# Patient Record
Sex: MALE | Race: WHITE
[De-identification: names, ages, dates, MRNs, and addresses within clinical notes are randomized per-mention and may not be internally consistent; named-entity substitution may affect disease eponyms.]

---

## 2019-01-03 ENCOUNTER — HOSPITAL ENCOUNTER (EMERGENCY)
Dept: HOSPITAL 61 - ER | Age: 24
Discharge: HOME | End: 2019-01-03
Payer: SELF-PAY

## 2019-01-03 VITALS — SYSTOLIC BLOOD PRESSURE: 152 MMHG | DIASTOLIC BLOOD PRESSURE: 71 MMHG

## 2019-01-03 VITALS — BODY MASS INDEX: 25.71 KG/M2 | WEIGHT: 160 LBS | HEIGHT: 66 IN

## 2019-01-03 DIAGNOSIS — S62.633A: Primary | ICD-10-CM
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
## 2019-01-03 PROCEDURE — 90715 TDAP VACCINE 7 YRS/> IM: CPT

## 2019-01-03 PROCEDURE — 90471 IMMUNIZATION ADMIN: CPT

## 2019-01-03 PROCEDURE — 29130 APPL FINGER SPLINT STATIC: CPT

## 2019-01-03 PROCEDURE — 73140 X-RAY EXAM OF FINGER(S): CPT

## 2019-01-03 NOTE — PHYS DOC
Past Medical History


Past Medical History:  No Pertinent History


Past Surgical History:  No Surgical History


Alcohol Use:  Occasionally


Drug Use:  Marijuana





Adult General


Chief Complaint


Chief Complaint:  FINGER INJURY





HPI


HPI





Patient is a 23  year old [f__sex] who presents with []





Review of Systems


Review of Systems





Constitutional: Denies fever or chills []


Eyes: Denies change in visual acuity, redness, or eye pain []


HENT: Denies nasal congestion or sore throat []


Respiratory: Denies cough or shortness of breath []


Cardiovascular: No additional information not addressed in HPI []


GI: Denies abdominal pain, nausea, vomiting, bloody stools or diarrhea []


: Denies dysuria or hematuria []


Musculoskeletal: Denies back pain or joint pain []


Integument: Denies rash or skin lesions []


Neurologic: Denies headache, focal weakness or sensory changes []


Endocrine: Denies polyuria or polydipsia []





All other systems were reviewed and found to be within normal limits, except as 

documented in this note.





Current Medications


Current Medications





Current Medications








 Medications


  (Trade)  Dose


 Ordered  Sig/Frida  Start Time


 Stop Time Status Last Admin


Dose Admin


 


 Diphtheria/


 Tetanus/Acell


 Pertussis


  (Boostrix)  0.5 ml  ONCE ONCE  1/3/19 12:45


 1/3/19 12:48 DC 1/3/19 12:54


0.5 ML


 


 Neomycin/


 Polymyxin/


 Bacitracin


  (Triple


 Antibiotic


 Ointment)  1 pkt  1X  ONCE  1/3/19 12:45


 1/3/19 12:49 DC 1/3/19 12:54


1 PKT











Allergies


Allergies





Allergies








Coded Allergies Type Severity Reaction Last Updated Verified


 


  No Known Drug Allergies    1/3/19 No











Physical Exam


Physical Exam





Constitutional: Well developed, well nourished, no acute distress, non-toxic 

appearance. []


HENT: Normocephalic, atraumatic, bilateral external ears normal, oropharynx 

moist, no oral exudates, nose normal. []


Eyes: PERRLA, EOMI, conjunctiva normal, no discharge. [] 


Neck: Normal range of motion, no tenderness, supple, no stridor. [] 


Cardiovascular:Heart rate regular rhythm, no murmur []


Lungs & Thorax:  Bilateral breath sounds clear to auscultation []


Abdomen: Bowel sounds normal, soft, no tenderness, no masses, no pulsatile 

masses. [] 


Skin: Warm, dry, no erythema, no rash. [] 


Back: No tenderness, no CVA tenderness. [] 


Extremities: No tenderness, no cyanosis, no clubbing, ROM intact, no edema. [] 


Neurologic: Alert and oriented X 3, normal motor function, normal sensory 

function, no focal deficits noted. []


Psychologic: Affect normal, judgement normal, mood normal. []





Current Patient Data


Vital Signs





 Vital Signs








  Date Time  Temp Pulse Resp B/P (MAP) Pulse Ox O2 Delivery O2 Flow Rate FiO2


 


1/3/19 11:42 98.7 89 16 152/71 (98) 98 Room Air  





 98.7       











EKG


EKG


[]





Radiology/Procedures


Radiology/Procedures


PROCEDURE: FINGER(S) LEFT





EXAM: Left long finger, 3 views.


 


HISTORY: Blunt trauma.


 


COMPARISON: None.


 


FINDINGS: 3 views of the left long finger are obtained. There is a mildly 


displaced fracture of the distal aspect of the third distal phalanx.


 


IMPRESSION: Mildly displaced third distal phalanx fracture.[]





Course & Med Decision Making


Course & Med Decision Making


Pertinent Labs and Imaging studies reviewed. (See chart for details)





[]





Dragon Disclaimer


Dragon Disclaimer


This electronic medical record was generated, in whole or in part, using a 

voice recognition dictation system.





Departure


Departure


Impression:  


 Primary Impression:  


 Crushing injury of finger of left hand


 Additional Impressions:  


 Laceration of left middle finger w/o foreign body w/o damage to nail


 Fracture of distal phalanx of finger of left hand


Disposition:  01 HOME, SELF-CARE


Condition:  STABLE


Referrals:  


NO PCP (PCP)


Patient Instructions:  Crush Injury, Fingers or Toes, Easy-to-Read, Finger 

Fracture, Easy-to-Read, Laceration, Old, Not Sutured





Additional Instructions:  


Fill prescription(s) and use as directed. 


Change the bandage over your laceration at least twice daily and apply 

antibiotic ointment, keep the area clean and dry, avoid submerging in water 

until laceration is healed.


Recommend application of ice, elevation, and rest of affected extremity. Wear 

the splint that was placed until follow up appointment. Return to the ER if 

your symptoms worsen.


Scripts


Cephalexin (KEFLEX) 500 Mg Capsule


500 MG PO QID for 7 Days, #28 CAP 0 Refills


   Prov: YU SONI APRRAMILA         1/3/19 


Hydrocodone Bit/Acetaminophen (HYDROCODONE-APAP 5-325  **) 1 Tab Tablet


1 TAB PO PRN Q6HRS PRN for PAIN for 3 Days, #12 TAB 0 Refills


   Prov: YU SONI APRN         1/3/19





Problem Qualifiers








 Additional Impressions:  


 Laceration of left middle finger w/o foreign body w/o damage to nail


 Encounter type:  initial encounter  Qualified Codes:  S61.213A - Laceration 

without foreign body of left middle finger without damage to nail, initial 

encounter








YU SONI APRN Donald 3, 2019 13:16

## 2019-01-03 NOTE — RAD
EXAM: Left long finger, 3 views.

 

HISTORY: Blunt trauma.

 

COMPARISON: None.

 

FINDINGS: 3 views of the left long finger are obtained. There is a mildly 

displaced fracture of the distal aspect of the third distal phalanx.

 

IMPRESSION: Mildly displaced third distal phalanx fracture.

 

Electronically signed by: Radha Plunkett MD (1/3/2019 1:06 PM) [id number]